# Patient Record
Sex: FEMALE | Race: WHITE | Employment: PART TIME | URBAN - METROPOLITAN AREA
[De-identification: names, ages, dates, MRNs, and addresses within clinical notes are randomized per-mention and may not be internally consistent; named-entity substitution may affect disease eponyms.]

---

## 2023-09-06 ENCOUNTER — OFFICE VISIT (OUTPATIENT)
Dept: OBGYN CLINIC | Age: 38
End: 2023-09-06
Payer: COMMERCIAL

## 2023-09-06 VITALS
BODY MASS INDEX: 28.82 KG/M2 | SYSTOLIC BLOOD PRESSURE: 128 MMHG | HEIGHT: 65 IN | WEIGHT: 173 LBS | DIASTOLIC BLOOD PRESSURE: 80 MMHG

## 2023-09-06 DIAGNOSIS — N92.6 MISSED MENSES: Primary | ICD-10-CM

## 2023-09-06 DIAGNOSIS — O20.0 THREATENED MISCARRIAGE: ICD-10-CM

## 2023-09-06 PROBLEM — Z84.81 FAMILY HISTORY OF CARRIER OF GENETIC DISEASE: Status: ACTIVE | Noted: 2021-12-21

## 2023-09-06 PROBLEM — H91.93 HIGH FREQUENCY HEARING LOSS OF BOTH EARS: Status: ACTIVE | Noted: 2017-09-15

## 2023-09-06 LAB
HCG SERPL-ACNC: ABNORMAL MIU/ML (ref 0–6)
HCG, PREGNANCY, URINE, POC: POSITIVE
VALID INTERNAL CONTROL, POC: ABNORMAL

## 2023-09-06 PROCEDURE — 81025 URINE PREGNANCY TEST: CPT | Performed by: OBSTETRICS & GYNECOLOGY

## 2023-09-06 PROCEDURE — 99214 OFFICE O/P EST MOD 30 MIN: CPT | Performed by: OBSTETRICS & GYNECOLOGY

## 2023-09-06 PROCEDURE — 76830 TRANSVAGINAL US NON-OB: CPT | Performed by: OBSTETRICS & GYNECOLOGY

## 2023-09-06 ASSESSMENT — PATIENT HEALTH QUESTIONNAIRE - PHQ9
SUM OF ALL RESPONSES TO PHQ QUESTIONS 1-9: 4
2. FEELING DOWN, DEPRESSED OR HOPELESS: 2
SUM OF ALL RESPONSES TO PHQ QUESTIONS 1-9: 4
SUM OF ALL RESPONSES TO PHQ9 QUESTIONS 1 & 2: 4
SUM OF ALL RESPONSES TO PHQ QUESTIONS 1-9: 4
1. LITTLE INTEREST OR PLEASURE IN DOING THINGS: 2
SUM OF ALL RESPONSES TO PHQ QUESTIONS 1-9: 4

## 2023-09-06 NOTE — PROGRESS NOTES
Patient presents today for   Chief Complaint   Patient presents with    Ultrasound     Misses menses       LMP: Patient's last menstrual period was 07/07/2023 (exact date). Contraception: none        Allergies   Allergen Reactions    Latex Itching     Current Outpatient Medications   Medication Sig Dispense Refill    Prenatal Vit-Fe Fumarate-FA (PRENATAL PO) Take by mouth      miSOPROStol (CYTOTEC) 200 MCG tablet Place 3 tablets vaginally in the morning and 3 tablets at noon and 3 tablets in the evening and 3 tablets before bedtime. Pt has miscarried and has retained products of miscarriage. . 24 tablet 0    oxyCODONE (ROXICODONE) 5 MG immediate release tablet Take 1 tablet by mouth every 6 hours as needed for Pain for up to 5 days. Intended supply: 5 days. Take lowest dose possible to manage pain Max Daily Amount: 20 mg 10 tablet 0    ibuprofen (ADVIL;MOTRIN) 800 MG tablet Take 1 tablet by mouth every 6 hours as needed for Pain 60 tablet 0     No current facility-administered medications for this visit. Past Medical History:   Diagnosis Date    Anxiety     Depression      History reviewed. No pertinent surgical history.   Social History     Socioeconomic History    Marital status:      Spouse name: Not on file    Number of children: Not on file    Years of education: Not on file    Highest education level: Not on file   Occupational History    Not on file   Tobacco Use    Smoking status: Never    Smokeless tobacco: Never   Vaping Use    Vaping Use: Not on file   Substance and Sexual Activity    Alcohol use: Not Currently    Drug use: Never    Sexual activity: Yes     Partners: Male   Other Topics Concern    Not on file   Social History Narrative    Not on file     Social Determinants of Health     Financial Resource Strain: Not on file   Food Insecurity: Not on file   Transportation Needs: Not on file   Physical Activity: Not on file   Stress: Not on file   Social Connections: Not on file   Intimate

## 2023-09-07 LAB
ABO + RH BLD: NORMAL
BLOOD BANK CMNT PATIENT-IMP: NORMAL
BLOOD GROUP ANTIBODIES SERPL: NORMAL
SPECIMEN EXP DATE BLD: NORMAL

## 2023-09-15 ENCOUNTER — OFFICE VISIT (OUTPATIENT)
Dept: OBGYN CLINIC | Age: 38
End: 2023-09-15
Payer: COMMERCIAL

## 2023-09-15 VITALS
DIASTOLIC BLOOD PRESSURE: 78 MMHG | WEIGHT: 173 LBS | BODY MASS INDEX: 28.82 KG/M2 | SYSTOLIC BLOOD PRESSURE: 124 MMHG | HEIGHT: 65 IN

## 2023-09-15 DIAGNOSIS — O20.0 THREATENED ABORTION: Primary | ICD-10-CM

## 2023-09-15 DIAGNOSIS — O03.4 RETAINED PRODUCTS OF CONCEPTION AFTER MISCARRIAGE: ICD-10-CM

## 2023-09-15 PROCEDURE — 99214 OFFICE O/P EST MOD 30 MIN: CPT | Performed by: OBSTETRICS & GYNECOLOGY

## 2023-09-15 PROCEDURE — 76817 TRANSVAGINAL US OBSTETRIC: CPT | Performed by: OBSTETRICS & GYNECOLOGY

## 2023-09-15 RX ORDER — IBUPROFEN 800 MG/1
800 TABLET ORAL EVERY 6 HOURS PRN
Qty: 60 TABLET | Refills: 0 | Status: SHIPPED | OUTPATIENT
Start: 2023-09-15

## 2023-09-15 RX ORDER — MISOPROSTOL 200 UG/1
600 TABLET ORAL EVERY 6 HOURS
Qty: 24 TABLET | Refills: 0 | Status: SHIPPED | OUTPATIENT
Start: 2023-09-15 | End: 2023-09-22 | Stop reason: SDUPTHER

## 2023-09-15 RX ORDER — OXYCODONE HYDROCHLORIDE 5 MG/1
5 TABLET ORAL EVERY 6 HOURS PRN
Qty: 10 TABLET | Refills: 0 | Status: SHIPPED | OUTPATIENT
Start: 2023-09-15 | End: 2023-09-20

## 2023-09-15 NOTE — PROGRESS NOTES
Patient presents today for   Chief Complaint   Patient presents with    Follow-up    Ultrasound    Threatened Miscarriage       LMP: Patient's last menstrual period was 07/03/2023 (exact date). Contraception: none        Allergies   Allergen Reactions    Latex Itching     Current Outpatient Medications   Medication Sig Dispense Refill    ibuprofen (ADVIL;MOTRIN) 800 MG tablet Take 1 tablet by mouth every 6 hours as needed for Pain 60 tablet 0    Prenatal Vit-Fe Fumarate-FA (PRENATAL PO) Take by mouth      miSOPROStol (CYTOTEC) 200 MCG tablet Place 3 tablets vaginally in the morning and 3 tablets at noon and 3 tablets in the evening and 3 tablets before bedtime. Pt has miscarried and has retained products of miscarriage and is repeating a course of this medication. . 24 tablet 0     No current facility-administered medications for this visit. Past Medical History:   Diagnosis Date    Anxiety     Depression      History reviewed. No pertinent surgical history.   Social History     Socioeconomic History    Marital status:      Spouse name: Not on file    Number of children: Not on file    Years of education: Not on file    Highest education level: Not on file   Occupational History    Not on file   Tobacco Use    Smoking status: Never    Smokeless tobacco: Never   Vaping Use    Vaping Use: Not on file   Substance and Sexual Activity    Alcohol use: Not Currently    Drug use: Never    Sexual activity: Yes     Partners: Male   Other Topics Concern    Not on file   Social History Narrative    Not on file     Social Determinants of Health     Financial Resource Strain: Not on file   Food Insecurity: Not on file   Transportation Needs: Not on file   Physical Activity: Not on file   Stress: Not on file   Social Connections: Not on file   Intimate Partner Violence: Not on file   Housing Stability: Not on file     Family History   Problem Relation Age of Onset    Hypertension Father     Diabetes Mother 36

## 2023-09-22 ENCOUNTER — OFFICE VISIT (OUTPATIENT)
Dept: OBGYN CLINIC | Age: 38
End: 2023-09-22
Payer: COMMERCIAL

## 2023-09-22 ENCOUNTER — TELEPHONE (OUTPATIENT)
Dept: OBGYN CLINIC | Age: 38
End: 2023-09-22

## 2023-09-22 VITALS
BODY MASS INDEX: 28.99 KG/M2 | HEIGHT: 65 IN | SYSTOLIC BLOOD PRESSURE: 130 MMHG | WEIGHT: 174 LBS | DIASTOLIC BLOOD PRESSURE: 80 MMHG

## 2023-09-22 DIAGNOSIS — O03.4 RETAINED PRODUCTS OF CONCEPTION AFTER MISCARRIAGE: Primary | ICD-10-CM

## 2023-09-22 PROCEDURE — 76830 TRANSVAGINAL US NON-OB: CPT | Performed by: OBSTETRICS & GYNECOLOGY

## 2023-09-22 RX ORDER — MISOPROSTOL 200 UG/1
600 TABLET ORAL EVERY 6 HOURS
Qty: 24 TABLET | Refills: 0 | Status: SHIPPED | OUTPATIENT
Start: 2023-09-22

## 2023-09-22 NOTE — TELEPHONE ENCOUNTER
Patient called nurse triage line to discuss MD recommendations from today. Reviewed recommendations with patient. Desires medical management option. Precautions given to patient.  She will call Monday to get a followup visit scheduled

## 2023-09-22 NOTE — PROGRESS NOTES
Chief Complaint   Patient presents with    Ultrasound       All images were reviewed and my impression is that findings are concerning for persistent retained products of conception after miscarriage. Medical versus surgical Tx was discussed at her last appt and she did a course of cytotec at home. Pt had to leave office before being seen by MD due to her daughter having developed a fever at  today. Called pt to discuss US results at 1:50pm this afternoon and no answer. Voicemail left stating my recommendation to repeat medication to attempt resolution of her miscarriage given that her endometrium is complex, avascular and lidia 15mm which is concerning for retained products of conception after miscarriage. If pt decides she would rather undergo surgery than repeat a course of cytotec, we can get this arranged for her on Monday. Medication sent to pharmacy for vaginal placement of cytotec pills with instructions to place every 6 hours. It should be noted that the previous pills may be palpable in the vagina when the next dose is due but the actual medication is absorbed much faster than the pill is dissolved thus pt should continue to place the next doses regardless of the presence of the previous pills when placing. Bleeding and ER precautions were discussed at length at her last appt.

## 2023-09-28 ENCOUNTER — OFFICE VISIT (OUTPATIENT)
Dept: OBGYN CLINIC | Age: 38
End: 2023-09-28
Payer: COMMERCIAL

## 2023-09-28 VITALS
WEIGHT: 172 LBS | DIASTOLIC BLOOD PRESSURE: 76 MMHG | SYSTOLIC BLOOD PRESSURE: 112 MMHG | HEIGHT: 65 IN | BODY MASS INDEX: 28.66 KG/M2

## 2023-09-28 DIAGNOSIS — M53.3 SI (SACROILIAC) PAIN: ICD-10-CM

## 2023-09-28 DIAGNOSIS — O03.9 FOLLOW-UP VISIT AFTER MISCARRIAGE: ICD-10-CM

## 2023-09-28 DIAGNOSIS — Z51.89 FOLLOW-UP VISIT AFTER MISCARRIAGE: ICD-10-CM

## 2023-09-28 DIAGNOSIS — O03.4 RETAINED PRODUCTS OF CONCEPTION AFTER MISCARRIAGE: Primary | ICD-10-CM

## 2023-09-28 DIAGNOSIS — M62.08 DIASTASIS RECTI: ICD-10-CM

## 2023-09-28 DIAGNOSIS — M25.559 HIP PAIN, UNSPECIFIED LATERALITY: ICD-10-CM

## 2023-09-28 PROCEDURE — 99213 OFFICE O/P EST LOW 20 MIN: CPT | Performed by: OBSTETRICS & GYNECOLOGY

## 2023-09-28 PROCEDURE — 76830 TRANSVAGINAL US NON-OB: CPT | Performed by: OBSTETRICS & GYNECOLOGY

## 2023-11-08 ENCOUNTER — HOSPITAL ENCOUNTER (OUTPATIENT)
Dept: PHYSICAL THERAPY | Age: 38
Setting detail: RECURRING SERIES
Discharge: HOME | End: 2023-11-11
Attending: OBSTETRICS & GYNECOLOGY
Payer: COMMERCIAL

## 2023-11-08 DIAGNOSIS — M54.51 VERTEBROGENIC LOW BACK PAIN: Primary | ICD-10-CM

## 2023-11-08 DIAGNOSIS — R26.2 DIFFICULTY IN WALKING, NOT ELSEWHERE CLASSIFIED: ICD-10-CM

## 2023-11-08 DIAGNOSIS — R27.8 OTHER LACK OF COORDINATION: ICD-10-CM

## 2023-11-08 DIAGNOSIS — M62.81 MUSCLE WEAKNESS (GENERALIZED): ICD-10-CM

## 2023-11-08 PROCEDURE — 97110 THERAPEUTIC EXERCISES: CPT

## 2023-11-08 PROCEDURE — 97161 PT EVAL LOW COMPLEX 20 MIN: CPT

## 2023-11-08 PROCEDURE — 97530 THERAPEUTIC ACTIVITIES: CPT

## 2023-11-08 ASSESSMENT — PAIN SCALES - GENERAL: PAINLEVEL_OUTOF10: 0

## 2023-11-08 NOTE — THERAPY EVALUATION
value is taken from all the answered items, then multiplied by 100 to obtain the scale score, ranging from 0-100. This process is repeated for each column representing bowel, bladder, and pelvic pain. Medical Necessity:   > Patient is expected to demonstrate progress in strength, range of motion, coordination, and functional technique to decrease pain and improve function. Reason For Services/Other Comments:  > Patient continues to require skilled intervention due to above mentioned deficits. Regarding [facility] therapy, I certify that the treatment plan above will be carried out by a therapist or under their direction. Thank you for this referral,  Suhas Palmer, PT     Referring Physician Signature: Al Whitaker MD No Signature is Required for this note.         Charge Capture  Appt Desk     Future Appointments   Date Time Provider [address]   11/29/2023  3:15 PM Fide Mcmillan, PT [facility]   12/4/2023  3:15 PM Roanne Felty, PT [facility]   12/6/2023  3:15 PM Fide Mcmillan, PT [facility]   12/11/2023  3:00 PM Suhas Palmer, PT [facility]   12/13/2023  2:30 PM Suhas Palmer, PT [facility]   12/18/2023  3:00 PM Suhas Palmer, PT [facility]   12/20/2023  3:15 PM Fide Mcmillan, PT [facility]   12/27/2023  8:15 AM [facility]

## 2023-11-09 NOTE — PROGRESS NOTES
Mery Armas  : 1985  Primary: Umr (Commercial)  Secondary:  [facility]  [address] [place] [facility] [place] [address]  Phone: [phone]  Fax: [phone] Plan Frequency: 1-2x/week for 90 days    Plan of Care/Certification Expiration Date: 24        Plan of Care/Certification Expiration Date:  Plan of Care/Certification Expiration Date: 24    Frequency/Duration:   Plan Frequency: 1-2x/week for 90 days      Time In/Out:   Time In: 1450  Time Out: 1600    PT Visit Info:    Plan Frequency: 1-2x/week for 90 days  Total # of Visits Approved: 25  Total # of Visits to Date: 1      Visit Count:  1    OUTPATIENT PHYSICAL THERAPY:   Treatment Note 2023     Episode  (PFPT)               Treatment Diagnosis:    Vertebrogenic low back pain  Muscle weakness (generalized)  Other lack of coordination  Difficulty in walking, not elsewhere classified  Medical/Referring Diagnosis:    Diastasis recti [M62.08]  Hip pain, unspecified laterality [M25.559]  SI (sacroiliac) pain [M53.3]    Referring Physician:  Marcelina An MD MD Orders:  PT Eval and Treat   Return MD Appt:  Not scheduled   Date of Onset:  Onset Date: 04/24/15  Allergies:   Latex  Restrictions/Precautions:   None      Interventions Planned (Treatment may consist of any combination of the following):     See Assessment Note      Subjective Comments:     R SI/hip pain  Initial Pain Level[de-identified]     0/10  Post Session Pain Level:       0/10  Medications Last Reviewed:  2023  Updated Objective Findings:  See Evaluation Note from today  Treatment   THERAPEUTIC EXERCISE: (25 minutes):  Exercises per grid below to improve mobility and coordination. Required moderate verbal and tactile cues to promote proper body breathing techniques and core engagement . Progressed range and repetitions as indicated.    Date:  23 Date:   Date:     Activity/Exercise Parameters Parameters Parameters   HEP Diaphragmatic breathing w/ gentle

## 2023-11-28 NOTE — PROGRESS NOTES
Progressed range and repetitions as indicated. Date:  11/8/23 Date:  11/29/23 Date:     Activity/Exercise Parameters Parameters Parameters   HEP Diaphragmatic breathing w/ gentle core engagement, happy baby stretch Review    Innominate/sacral correction 6x6s/ 5x R HS/L Quads x 8 reps x 2 rounds    LTR w/ OP to L  20 reps x 2 rounds    Bridge 5x3 W/ TA and CB; 15x    Hip adduction x15 W/ TA and CB x 15    Hip abduction X15 L3  W/ TA and CB x 15    Happy baby 3x30s 5 x 30sec    LTR  15x B    Open book  15x B                      Kinesiotaping for R SI joint space correction  Done    Recumbent Stepper  L3; 10min      Treatment/Session Summary:    Treatment Assessment:   Neutral pelvic, lumbar and sacral alignment achieved with MET and manual therapy. She did well with core and B hip engagement today. Improved R side trunk tightness with open book. Less R SI joint pain after kinesiotaping L. Communication/Consultation:  None today  Equipment provided today:   Ice to place at R posterior hip  and kinesiotape  Recommendations/Intent for next treatment session: Next visit will focus on advancements to more challenging core and B hip strengthening after pelvic/lumbar/sacral assessment and correction.      >Total Treatment Billable Duration: 45 minutes therex     Time In: 1520  Time Out: [address] Brian Duque, PT         Charge Capture  [facility] [employer] Portal  Appt Desk     Future Appointments   Date Time Provider [address]   12/4/2023  7:00 PM [facility]   12/6/2023  3:15 PM Chris Elder, PT [facility]   12/11/2023  3:00 PM Evita Montesinos, PT [facility]   12/13/2023  2:30 PM Evita Montesinos, PT [facility]   12/18/2023  3:00 PM [facility]   12/20/2023  3:15 PM Chris Elder, PT [facility]   12/27/2023  8:15 AM Mary Beth Gavin, PT [facility]

## 2023-11-29 ENCOUNTER — HOSPITAL ENCOUNTER (OUTPATIENT)
Dept: PHYSICAL THERAPY | Age: 38
Setting detail: RECURRING SERIES
Discharge: HOME | End: 2023-12-02
Attending: OBSTETRICS & GYNECOLOGY
Payer: COMMERCIAL

## 2023-11-29 PROCEDURE — 97110 THERAPEUTIC EXERCISES: CPT

## 2023-11-29 ASSESSMENT — PAIN DESCRIPTION - LOCATION: LOCATION: BACK

## 2023-11-29 ASSESSMENT — PAIN DESCRIPTION - ORIENTATION: ORIENTATION: RIGHT

## 2023-11-29 ASSESSMENT — PAIN SCALES - GENERAL: PAINLEVEL_OUTOF10: 2

## 2023-11-29 ASSESSMENT — PAIN DESCRIPTION - PAIN TYPE: TYPE: CHRONIC PAIN

## 2023-11-29 ASSESSMENT — PAIN DESCRIPTION - DESCRIPTORS: DESCRIPTORS: ACHING

## 2023-12-04 ENCOUNTER — HOSPITAL ENCOUNTER (OUTPATIENT)
Dept: PHYSICAL THERAPY | Age: 38
Setting detail: RECURRING SERIES
End: 2023-12-04
Attending: OBSTETRICS & GYNECOLOGY
Payer: COMMERCIAL

## 2023-12-04 NOTE — PROGRESS NOTES
Miya Lambert  : 1985  Primary: Umr  Secondary:  [facility]  [address]  [address]  [facility] [address]  Phone: [phone]  Fax: [phone] Plan Frequency: 1-2x/week for 90 days    Plan of Care/Certification Expiration Date: 24      PT Visit Info:  Total # of Visits Approved: 25  Total # of Visits to Date: 2         OUTPATIENT PHYSICAL THERAPY 2023     Appt Desk   Episode   MyChart      Ms. Lambert was a cancellation due to conflict with schedule      Fozia Rodriguez, PT    Future Appointments   Date Time Provider Department Center   2023  7:00 PM Fozia Rodriguez, PT [facility]   2023  3:15 PM Graciela Gavin, PT [facility]   2023  3:00 PM Doretha Mcdowell, PT [facility]   2023  2:30 PM Doretha Mcdowell, PT [facility]   2023  3:00 PM Doretha Mcdowell, PT [facility]   2023  3:15 PM Graciela Gavin, PT [facility]   2023  8:15 AM Graciela Gavin, PT [facility]

## 2023-12-06 ENCOUNTER — HOSPITAL ENCOUNTER (OUTPATIENT)
Dept: PHYSICAL THERAPY | Age: 38
Setting detail: RECURRING SERIES
Discharge: HOME | End: 2023-12-09
Attending: OBSTETRICS & GYNECOLOGY
Payer: COMMERCIAL

## 2023-12-06 PROCEDURE — 97110 THERAPEUTIC EXERCISES: CPT

## 2023-12-06 PROCEDURE — 97140 MANUAL THERAPY 1/> REGIONS: CPT

## 2023-12-06 ASSESSMENT — PAIN DESCRIPTION - ORIENTATION: ORIENTATION: RIGHT

## 2023-12-06 ASSESSMENT — PAIN DESCRIPTION - PAIN TYPE: TYPE: CHRONIC PAIN

## 2023-12-06 ASSESSMENT — PAIN SCALES - GENERAL: PAINLEVEL_OUTOF10: 2

## 2023-12-06 ASSESSMENT — PAIN DESCRIPTION - LOCATION: LOCATION: BACK

## 2023-12-06 NOTE — PROGRESS NOTES
Caterina Lee  : 1985  Primary: Umr (Commercial)  Secondary:  [facility]  [address] [place] [address]  Phone: [phone]  Fax: [phone] Plan Frequency: 1-2x/week for 90 days    Plan of Care/Certification Expiration Date: 24        Plan of Care/Certification Expiration Date:  Plan of Care/Certification Expiration Date: 24    Frequency/Duration:   Plan Frequency: 1-2x/week for 90 days      Time In/Out:   Time In: 1515  Time Out: 1615    PT Visit Info:    Plan Frequency: 1-2x/week for 90 days  Total # of Visits Approved: 25  Total # of Visits to Date: 3      Visit Count:  3    OUTPATIENT PHYSICAL THERAPY:   Treatment Note 2023     Episode  (PFPT)               Treatment Diagnosis:    Vertebrogenic low back pain  Muscle weakness (generalized)  Other lack of coordination  Difficulty in walking, not elsewhere classified  Medical/Referring Diagnosis:    Diastasis recti [M62.08]  Hip pain, unspecified laterality [M25.559]  SI (sacroiliac) pain [M53.3]    Referring Physician:  Danilo Shanks MD MD Orders:  PT Eval and Treat   Return MD Appt:  Not scheduled   Date of Onset:  Onset Date: 04/24/15  Allergies:   Latex  Restrictions/Precautions:   None      Interventions Planned (Treatment may consist of any combination of the following):     See Assessment Note      Subjective Comments:     Patient c/o R SI joint soreness today. She reports having more pain with difficulty walking over the weekend. Initial Pain Level[de-identified] Right Back 2/10  Post Session Pain Level:  Right  Back 1/10  Medications Last Reviewed:  2023  Updated Objective Findings:   L anterior innominate rotation, CCW sacral rotation  Treatment   THERAPEUTIC EXERCISE: (40 minutes):  Exercises per grid below to improve mobility and coordination. Required moderate verbal and tactile cues to promote proper body breathing techniques and core engagement .   Progressed range and repetitions as

## 2023-12-13 ENCOUNTER — HOSPITAL ENCOUNTER (OUTPATIENT)
Dept: PHYSICAL THERAPY | Age: 38
Setting detail: RECURRING SERIES
End: 2023-12-13
Attending: OBSTETRICS & GYNECOLOGY
Payer: COMMERCIAL

## 2023-12-13 NOTE — PROGRESS NOTES
Miya Lambert  : 1985  Primary: Umr  Secondary:  [facility]  [address]  [address] [place] [address]  Phone: [phone]  Fax: [phone]    PT Visit Info:    Total # of Visits Approved: 25  Total # of Visits to Date: 3  No Show: 1  Canceled Appointment: 2      OUTPATIENT THERAPY: 2023  Episode  Appt Desk        Miya Lambert cancelled her appointment for today due to work related issues.  Thank you,  Doretha Mcdowell, PT    Future Appointments   Date Time Provider Department Center   2023  7:00 PM Doretha Mcdowell, PT [facility]   2023  3:00 PM Doretha Mcdowell, PT [facility]   2023  3:15 PM Graciela Gavin, PT [facility]   2023  8:15 AM Graciela Gavin, PT [facility]

## 2023-12-18 ENCOUNTER — HOSPITAL ENCOUNTER (OUTPATIENT)
Dept: PHYSICAL THERAPY | Age: 38
Setting detail: RECURRING SERIES
End: 2023-12-18
Attending: OBSTETRICS & GYNECOLOGY
Payer: COMMERCIAL

## 2023-12-18 NOTE — PROGRESS NOTES
Miya Lambert  : 1985  Primary: Umr  Secondary:  [facility]  [address]  [address] [place] [address]  Phone: [phone]  Fax: [phone]    PT Visit Info:    Total # of Visits Approved: 25  Total # of Visits to Date: 3  No Show: 1  Canceled Appointment: 3      OUTPATIENT THERAPY: 2023  Episode  Appt Desk        Miya Lambert cancelled her appointment for today due to illness.  Thank you,  Doretha Mcdowell, PT    Future Appointments   Date Time Provider Department Center   2023  3:00 PM Doretha Mcdowell PT [facility]   2023  3:15 PM Graciela Gavin PT [facility]   2023  8:15 AM Graciela Gavin PT [facility]

## 2023-12-20 ENCOUNTER — APPOINTMENT (OUTPATIENT)
Dept: PHYSICAL THERAPY | Age: 38
End: 2023-12-20
Attending: OBSTETRICS & GYNECOLOGY
Payer: COMMERCIAL

## 2023-12-26 NOTE — PROGRESS NOTES
daily - 7 x weekly - 2 sets - 10 reps  - Sidelying Hip Abduction  - 2 x daily - 7 x weekly - 2 sets - 10 reps  - Sidelying Hip Abduction with Resistance at Thighs  - 2 x daily - 7 x weekly - 2 sets - 10 reps  Treatment/Session Summary:    Treatment Assessment:   Mild R anterior innominate rotation corrected with MET. She was able to perform self correction with ease. She did well with all strengthening above, therefore I gave her a comprehensive HEP. Communication/Consultation:  None today  Equipment provided today:   Ice; HEP; L2/3/4 TB  Recommendations/Intent for next treatment session: Next visit will focus on advancements to more challenging core and B hip strengthening after pelvic/lumbar/sacral assessment and correction. >Total Treatment Billable Duration:  30 minutes ( 25 min therex; 5 min manual)     Time In: 0830  Time Out: 0900    Baby Coupe, PT         Charge Capture  [facility] Portal  Appt Desk     No future appointments.

## 2023-12-27 ENCOUNTER — HOSPITAL ENCOUNTER (OUTPATIENT)
Dept: PHYSICAL THERAPY | Age: 38
Setting detail: RECURRING SERIES
Discharge: HOME | End: 2023-12-30
Attending: OBSTETRICS & GYNECOLOGY
Payer: COMMERCIAL

## 2023-12-27 PROCEDURE — 97110 THERAPEUTIC EXERCISES: CPT

## 2024-03-06 ENCOUNTER — CLINICAL DOCUMENTATION (OUTPATIENT)
Dept: PHYSICAL THERAPY | Age: 39
End: 2024-03-06

## 2024-03-06 NOTE — THERAPY DISCHARGE
[place] [address]  [address]  [facility] [address]  Phone: [phone]  Fax: [phone]    OUTPATIENT PHYSICAL THERAPY  Discharge Summary 3/6/2024  Episode  Appt Desk         Miya Lambert has been seen in physical therapy for 4  visits from 11/8/2023 to 12/27/2023, with 3 cancellations and 1 no shows. Ms. Lambert's therapy has come to an end at this time due to: Patient did not return for/schedule additional treatment  Patient noncompliance    Physical Therapy Goals:  Unable to determine progress toward goals due to patient not returning.    Doretha Mcdowell, PT

## 2024-11-09 SDOH — HEALTH STABILITY: PHYSICAL HEALTH: ON AVERAGE, HOW MANY MINUTES DO YOU ENGAGE IN EXERCISE AT THIS LEVEL?: 0 MIN

## 2024-11-09 SDOH — HEALTH STABILITY: PHYSICAL HEALTH: ON AVERAGE, HOW MANY DAYS PER WEEK DO YOU ENGAGE IN MODERATE TO STRENUOUS EXERCISE (LIKE A BRISK WALK)?: 0 DAYS

## 2024-11-11 ENCOUNTER — OFFICE VISIT (OUTPATIENT)
Dept: FAMILY MEDICINE CLINIC | Facility: CLINIC | Age: 39
End: 2024-11-11
Payer: COMMERCIAL

## 2024-11-11 VITALS
BODY MASS INDEX: 28.82 KG/M2 | SYSTOLIC BLOOD PRESSURE: 124 MMHG | RESPIRATION RATE: 16 BRPM | HEIGHT: 65 IN | WEIGHT: 173 LBS | TEMPERATURE: 97.2 F | DIASTOLIC BLOOD PRESSURE: 78 MMHG | HEART RATE: 77 BPM | OXYGEN SATURATION: 99 %

## 2024-11-11 DIAGNOSIS — F41.9 ANXIETY: ICD-10-CM

## 2024-11-11 DIAGNOSIS — R41.840 DIFFICULTY CONCENTRATING: ICD-10-CM

## 2024-11-11 DIAGNOSIS — N89.8 VAGINAL ODOR: Primary | ICD-10-CM

## 2024-11-11 DIAGNOSIS — N89.8 VAGINAL ODOR: ICD-10-CM

## 2024-11-11 DIAGNOSIS — E55.9 VITAMIN D DEFICIENCY: Primary | ICD-10-CM

## 2024-11-11 LAB
BASOPHILS # BLD: 0 K/UL (ref 0–0.2)
BASOPHILS NFR BLD: 1 % (ref 0–2)
BILIRUBIN, URINE, POC: NEGATIVE
BLOOD URINE, POC: NORMAL
DIFFERENTIAL METHOD BLD: NORMAL
EOSINOPHIL # BLD: 0.1 K/UL (ref 0–0.8)
EOSINOPHIL NFR BLD: 2 % (ref 0.5–7.8)
ERYTHROCYTE [DISTWIDTH] IN BLOOD BY AUTOMATED COUNT: 12.3 % (ref 11.9–14.6)
GLUCOSE URINE, POC: NEGATIVE
HCT VFR BLD AUTO: 40.5 % (ref 35.8–46.3)
HGB BLD-MCNC: 13.9 G/DL (ref 11.7–15.4)
IMM GRANULOCYTES # BLD AUTO: 0 K/UL (ref 0–0.5)
IMM GRANULOCYTES NFR BLD AUTO: 0 % (ref 0–5)
KETONES, URINE, POC: NORMAL
LEUKOCYTE ESTERASE, URINE, POC: NORMAL
LYMPHOCYTES # BLD: 1.2 K/UL (ref 0.5–4.6)
LYMPHOCYTES NFR BLD: 27 % (ref 13–44)
MCH RBC QN AUTO: 31 PG (ref 26.1–32.9)
MCHC RBC AUTO-ENTMCNC: 34.3 G/DL (ref 31.4–35)
MCV RBC AUTO: 90.2 FL (ref 82–102)
MONOCYTES # BLD: 0.3 K/UL (ref 0.1–1.3)
MONOCYTES NFR BLD: 6 % (ref 4–12)
NEUTS SEG # BLD: 2.9 K/UL (ref 1.7–8.2)
NEUTS SEG NFR BLD: 64 % (ref 43–78)
NITRITE, URINE, POC: NEGATIVE
NRBC # BLD: 0 K/UL (ref 0–0.2)
PH, URINE, POC: 7.5 (ref 4.6–8)
PLATELET # BLD AUTO: 238 K/UL (ref 150–450)
PMV BLD AUTO: 10.2 FL (ref 9.4–12.3)
PROTEIN,URINE, POC: NORMAL
RBC # BLD AUTO: 4.49 M/UL (ref 4.05–5.2)
SPECIFIC GRAVITY, URINE, POC: 1.02 (ref 1–1.03)
URINALYSIS CLARITY, POC: NORMAL
URINALYSIS COLOR, POC: NORMAL
UROBILINOGEN, POC: NORMAL
WBC # BLD AUTO: 4.5 K/UL (ref 4.3–11.1)

## 2024-11-11 PROCEDURE — 81003 URINALYSIS AUTO W/O SCOPE: CPT | Performed by: NURSE PRACTITIONER

## 2024-11-11 PROCEDURE — 99203 OFFICE O/P NEW LOW 30 MIN: CPT | Performed by: NURSE PRACTITIONER

## 2024-11-11 RX ORDER — METHYLPHENIDATE HYDROCHLORIDE 18 MG/1
18 TABLET ORAL EVERY MORNING
COMMUNITY
Start: 2024-10-24

## 2024-11-11 SDOH — ECONOMIC STABILITY: FOOD INSECURITY: WITHIN THE PAST 12 MONTHS, YOU WORRIED THAT YOUR FOOD WOULD RUN OUT BEFORE YOU GOT MONEY TO BUY MORE.: NEVER TRUE

## 2024-11-11 SDOH — ECONOMIC STABILITY: FOOD INSECURITY: WITHIN THE PAST 12 MONTHS, THE FOOD YOU BOUGHT JUST DIDN'T LAST AND YOU DIDN'T HAVE MONEY TO GET MORE.: NEVER TRUE

## 2024-11-11 SDOH — ECONOMIC STABILITY: INCOME INSECURITY: HOW HARD IS IT FOR YOU TO PAY FOR THE VERY BASICS LIKE FOOD, HOUSING, MEDICAL CARE, AND HEATING?: NOT HARD AT ALL

## 2024-11-11 ASSESSMENT — PATIENT HEALTH QUESTIONNAIRE - PHQ9
SUM OF ALL RESPONSES TO PHQ QUESTIONS 1-9: 0
SUM OF ALL RESPONSES TO PHQ9 QUESTIONS 1 & 2: 0
SUM OF ALL RESPONSES TO PHQ QUESTIONS 1-9: 0
1. LITTLE INTEREST OR PLEASURE IN DOING THINGS: NOT AT ALL
SUM OF ALL RESPONSES TO PHQ QUESTIONS 1-9: 0
SUM OF ALL RESPONSES TO PHQ QUESTIONS 1-9: 0
2. FEELING DOWN, DEPRESSED OR HOPELESS: NOT AT ALL

## 2024-11-11 NOTE — PROGRESS NOTES
Miya Lambert (: 1985) presents today to establish care and for STI testing. Not having any symptoms today except vaginal odor. No STIs in the past.     On concerta 18 mg- seeing psychiatrist NP - started this a week ago.     Hx of anxiety/depression. Some anxiety now. She has 2 kids- had PPD with 9 year old. Also has 2 year old- anxiety started after this birth.     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 No pertinent surgical history.  Family History   Problem Relation Age of Onset    High Blood Pressure Mother     Depression Mother     Diabetes Mother 40    Hypertension Mother     Heart Failure Mother 73        CHF    Lung Cancer Mother     Transient ischemic attack  Mother     Other Father     Hypertension Father     Breast Cancer Neg Hx     Ovarian Cancer Neg Hx     Uterine Cancer Neg Hx     Colon Cancer Neg Hx      Social History     Socioeconomic History    Marital status:      Spouse name: None    Number of children: None    Years of education: None    Highest education level: None   Tobacco Use    Smoking status: Never    Smokeless tobacco: Never   Vaping Use    Vaping status: Never Used   Substance and Sexual Activity    Alcohol use: Yes     Alcohol/week: 2.0 standard drinks of alcohol     Types: 2 Glasses of wine per week    Drug use: Never    Sexual activity: Yes     Partners: Male     Social Determinants of Health     Financial Resource Strain: Low Risk  (2024)    Overall Financial Resource Strain (CARDIA)     Difficulty of Paying Living Expenses: Not hard at all   Food Insecurity: No Food

## 2024-11-12 LAB
25(OH)D3 SERPL-MCNC: 25.7 NG/ML (ref 30–100)
ALBUMIN SERPL-MCNC: 4.6 G/DL (ref 3.5–5)
ALBUMIN/GLOB SERPL: 1.8 (ref 1–1.9)
ALP SERPL-CCNC: 56 U/L (ref 35–104)
ALT SERPL-CCNC: 14 U/L (ref 8–45)
ANION GAP SERPL CALC-SCNC: 12 MMOL/L (ref 7–16)
AST SERPL-CCNC: 16 U/L (ref 15–37)
BILIRUB SERPL-MCNC: 0.6 MG/DL (ref 0–1.2)
BUN SERPL-MCNC: 8 MG/DL (ref 6–23)
CALCIUM SERPL-MCNC: 10 MG/DL (ref 8.8–10.2)
CHLORIDE SERPL-SCNC: 104 MMOL/L (ref 98–107)
CO2 SERPL-SCNC: 25 MMOL/L (ref 20–29)
CREAT SERPL-MCNC: 0.79 MG/DL (ref 0.6–1.1)
FERRITIN SERPL-MCNC: 33 NG/ML (ref 8–388)
GLOBULIN SER CALC-MCNC: 2.6 G/DL (ref 2.3–3.5)
GLUCOSE SERPL-MCNC: 98 MG/DL (ref 70–99)
POTASSIUM SERPL-SCNC: 3.8 MMOL/L (ref 3.5–5.1)
PROT SERPL-MCNC: 7.2 G/DL (ref 6.3–8.2)
SODIUM SERPL-SCNC: 141 MMOL/L (ref 136–145)
TSH, 3RD GENERATION: 1.04 UIU/ML (ref 0.27–4.2)
VIT B12 SERPL-MCNC: 277 PG/ML (ref 193–986)

## 2024-11-12 RX ORDER — ERGOCALCIFEROL 1.25 MG/1
50000 CAPSULE, LIQUID FILLED ORAL WEEKLY
Qty: 12 CAPSULE | Refills: 1 | Status: SHIPPED | OUTPATIENT
Start: 2024-11-12

## 2024-11-13 LAB
C TRACH RRNA SPEC QL NAA+PROBE: NEGATIVE
N GONORRHOEA RRNA SPEC QL NAA+PROBE: NEGATIVE
SPECIMEN SOURCE: NORMAL
T VAGINALIS RRNA SPEC QL NAA+PROBE: NEGATIVE

## 2025-04-29 ENCOUNTER — OFFICE VISIT (OUTPATIENT)
Dept: OBGYN CLINIC | Age: 40
End: 2025-04-29
Payer: COMMERCIAL

## 2025-04-29 VITALS
HEIGHT: 65 IN | DIASTOLIC BLOOD PRESSURE: 86 MMHG | WEIGHT: 172 LBS | SYSTOLIC BLOOD PRESSURE: 124 MMHG | BODY MASS INDEX: 28.66 KG/M2

## 2025-04-29 DIAGNOSIS — Z12.4 SCREENING FOR CERVICAL CANCER: ICD-10-CM

## 2025-04-29 DIAGNOSIS — Z11.51 SCREENING FOR HPV (HUMAN PAPILLOMAVIRUS): ICD-10-CM

## 2025-04-29 DIAGNOSIS — Z01.419 WELL WOMAN EXAM WITH ROUTINE GYNECOLOGICAL EXAM: Primary | ICD-10-CM

## 2025-04-29 DIAGNOSIS — Z11.8 SCREENING EXAMINATION FOR PARASITIC INFECTION: ICD-10-CM

## 2025-04-29 DIAGNOSIS — Z12.31 ENCOUNTER FOR SCREENING MAMMOGRAM FOR MALIGNANT NEOPLASM OF BREAST: ICD-10-CM

## 2025-04-29 DIAGNOSIS — Z11.3 SCREENING FOR STD (SEXUALLY TRANSMITTED DISEASE): ICD-10-CM

## 2025-04-29 LAB
HBV SURFACE AG SER QL: NONREACTIVE
HCV AB SER QL: NONREACTIVE
HIV 1+2 AB+HIV1 P24 AG SERPL QL IA: NONREACTIVE
HIV 1/2 RESULT COMMENT: NORMAL
T PALLIDUM AB SER QL IA: NONREACTIVE

## 2025-04-29 PROCEDURE — 99395 PREV VISIT EST AGE 18-39: CPT | Performed by: OBSTETRICS & GYNECOLOGY

## 2025-04-29 RX ORDER — ESCITALOPRAM OXALATE 10 MG/1
10 TABLET ORAL DAILY
COMMUNITY
Start: 2025-02-21

## 2025-04-29 SDOH — ECONOMIC STABILITY: FOOD INSECURITY: WITHIN THE PAST 12 MONTHS, YOU WORRIED THAT YOUR FOOD WOULD RUN OUT BEFORE YOU GOT MONEY TO BUY MORE.: NEVER TRUE

## 2025-04-29 SDOH — ECONOMIC STABILITY: FOOD INSECURITY: WITHIN THE PAST 12 MONTHS, THE FOOD YOU BOUGHT JUST DIDN'T LAST AND YOU DIDN'T HAVE MONEY TO GET MORE.: NEVER TRUE

## 2025-04-29 ASSESSMENT — PATIENT HEALTH QUESTIONNAIRE - PHQ9
1. LITTLE INTEREST OR PLEASURE IN DOING THINGS: SEVERAL DAYS
SUM OF ALL RESPONSES TO PHQ QUESTIONS 1-9: 2
SUM OF ALL RESPONSES TO PHQ QUESTIONS 1-9: 2
2. FEELING DOWN, DEPRESSED OR HOPELESS: SEVERAL DAYS

## 2025-04-29 NOTE — PROGRESS NOTES
Patient presents today for   Chief Complaint   Patient presents with    Annual Exam       Reports regular menses  LMP: Patient's last menstrual period was 04/13/2025.  Contraception: none        Allergies   Allergen Reactions    Latex Itching     Current Outpatient Medications   Medication Sig Dispense Refill    escitalopram (LEXAPRO) 10 MG tablet Take 1 tablet by mouth daily      vitamin D (ERGOCALCIFEROL) 1.25 MG (03758 UT) CAPS capsule Take 1 capsule by mouth once a week 12 capsule 1    methylphenidate (CONCERTA) 18 MG extended release tablet Take 1 tablet by mouth every morning.       No current facility-administered medications for this visit.     Past Medical History:   Diagnosis Date    Anxiety     Depression      No past surgical history on file.  Social History     Socioeconomic History    Marital status:      Spouse name: Not on file    Number of children: Not on file    Years of education: Not on file    Highest education level: Not on file   Occupational History    Not on file   Tobacco Use    Smoking status: Never    Smokeless tobacco: Never   Vaping Use    Vaping status: Never Used   Substance and Sexual Activity    Alcohol use: Yes     Alcohol/week: 2.0 standard drinks of alcohol     Types: 2 Glasses of wine per week    Drug use: Never    Sexual activity: Yes     Partners: Male   Other Topics Concern    Not on file   Social History Narrative    Not on file     Social Drivers of Health     Financial Resource Strain: Low Risk  (11/11/2024)    Overall Financial Resource Strain (CARDIA)     Difficulty of Paying Living Expenses: Not hard at all   Food Insecurity: No Food Insecurity (4/29/2025)    Hunger Vital Sign     Worried About Running Out of Food in the Last Year: Never true     Ran Out of Food in the Last Year: Never true   Transportation Needs: No Transportation Needs (4/29/2025)    PRAPARE - Transportation     Lack of Transportation (Medical): No     Lack of Transportation (Non-Medical): No

## 2025-04-29 NOTE — PATIENT INSTRUCTIONS
[place] Transportation Resources*  (Call 211/United Way if you need more resources.)    Medicaid Van: [employer]   They offer: Non-emergency medical transportation for Medicaid members and some Medicare Advantage plans  Contact: [phone]   Helpful Info: Must call for a ride at least 3 days before your appointment.  Call Monday- Friday 8:00am to 5:00pm. Transportation is available for MD appts, dialysis, x-rays, lab work, drug store, or other medical appointments.     [facility] Agency on Aging  What they offer: Provides assistance and medical transport to adults 60 years and older.  Contact: [phone]    [employer]   What they offer: Charge a fee for transport (not free).  Contact: [phone]    [employer] Demand   They Offer: Charge a fee for transport (not free).  Contact: [phone]                          [place] Housing Resources*  (Call United Way/211 if you need more resources.)    [place] [employer]  They Offer: Housing Choice Vouchers (Section 8) rental assistance available to persons with disabilities, families with children, and older adults whose household income is below 80% the median income for [facility].   Contact: [phone]; Website:www.[employer]    [employer] [place] [employer] Homeless - National Call Center   They offer: Free 24/7 access to trained counselors for local resources and assistance.  Contact: [phone] Website: www.[place].gov[place]homeless/nationalcallcenter.asp      [employer].com    https://www.Starbelly.com.[employer]/niqezzryws-xdgswn-qr/    [place] Housing Search    https://www.Mover.[employer]/    [employer] Connections:  They Offer: Homelessness Prevention, Affordable Housing, Outreach Support  Contact: [phone]; [address] [place] [address], [place] [address]  Helpful Info: Hours are Monday -Friday 8:30am-4:30pm.    [place] [employer]   They Offer: Provides shelter to homeless men and families,

## 2025-05-06 LAB
C TRACH RRNA CVX QL NAA+PROBE: NEGATIVE
COLLECTION METHOD: NORMAL
CYTOLOGIST CVX/VAG CYTO: NORMAL
CYTOLOGY CVX/VAG DOC THIN PREP: NORMAL
HPV APTIMA: NEGATIVE
HPV GENOTYPE REFLEX: NORMAL
Lab: NORMAL
N GONORRHOEA RRNA CVX QL NAA+PROBE: NEGATIVE
PAP SOURCE: NORMAL
PATH REPORT.FINAL DX SPEC: NORMAL
STAT OF ADQ CVX/VAG CYTO-IMP: NORMAL
T VAGINALIS RRNA SPEC QL NAA+PROBE: NEGATIVE

## 2025-08-28 ENCOUNTER — OFFICE VISIT (OUTPATIENT)
Dept: OBGYN CLINIC | Age: 40
End: 2025-08-28

## 2025-08-28 VITALS — SYSTOLIC BLOOD PRESSURE: 106 MMHG | WEIGHT: 172.8 LBS | DIASTOLIC BLOOD PRESSURE: 62 MMHG | BODY MASS INDEX: 28.76 KG/M2

## 2025-08-28 DIAGNOSIS — Z30.431 IUD CHECK UP: Primary | ICD-10-CM

## 2025-08-28 RX ORDER — HYDROXYZINE HYDROCHLORIDE 10 MG/1
10 TABLET, FILM COATED ORAL
COMMUNITY
Start: 2025-07-16

## 2025-08-28 RX ORDER — LEVONORGESTREL 52 MG/1
1 INTRAUTERINE DEVICE INTRAUTERINE ONCE
COMMUNITY
Start: 2025-06-25

## 2025-08-28 ASSESSMENT — PATIENT HEALTH QUESTIONNAIRE - PHQ9
1. LITTLE INTEREST OR PLEASURE IN DOING THINGS: NOT AT ALL
SUM OF ALL RESPONSES TO PHQ QUESTIONS 1-9: 0
SUM OF ALL RESPONSES TO PHQ QUESTIONS 1-9: 0
2. FEELING DOWN, DEPRESSED OR HOPELESS: NOT AT ALL
SUM OF ALL RESPONSES TO PHQ QUESTIONS 1-9: 0
SUM OF ALL RESPONSES TO PHQ QUESTIONS 1-9: 0